# Patient Record
Sex: FEMALE | URBAN - METROPOLITAN AREA
[De-identification: names, ages, dates, MRNs, and addresses within clinical notes are randomized per-mention and may not be internally consistent; named-entity substitution may affect disease eponyms.]

---

## 2019-04-10 ENCOUNTER — APPOINTMENT (RX ONLY)
Dept: URBAN - METROPOLITAN AREA CLINIC 35 | Facility: CLINIC | Age: 84
Setting detail: DERMATOLOGY
End: 2019-04-10

## 2019-04-10 DIAGNOSIS — D18.0 HEMANGIOMA: ICD-10-CM

## 2019-04-10 DIAGNOSIS — L82.1 OTHER SEBORRHEIC KERATOSIS: ICD-10-CM

## 2019-04-10 DIAGNOSIS — L91.8 OTHER HYPERTROPHIC DISORDERS OF THE SKIN: ICD-10-CM

## 2019-04-10 DIAGNOSIS — L82.0 INFLAMED SEBORRHEIC KERATOSIS: ICD-10-CM

## 2019-04-10 PROBLEM — D18.01 HEMANGIOMA OF SKIN AND SUBCUTANEOUS TISSUE: Status: ACTIVE | Noted: 2019-04-10

## 2019-04-10 PROCEDURE — 17110 DESTRUCTION B9 LES UP TO 14: CPT

## 2019-04-10 PROCEDURE — 99202 OFFICE O/P NEW SF 15 MIN: CPT | Mod: 25

## 2019-04-10 PROCEDURE — ? COUNSELING

## 2019-04-10 PROCEDURE — ? OBSERVATION

## 2019-04-10 PROCEDURE — ? LIQUID NITROGEN

## 2019-04-10 ASSESSMENT — LOCATION DETAILED DESCRIPTION DERM
LOCATION DETAILED: LEFT RIB CAGE
LOCATION DETAILED: LEFT MEDIAL UPPER BACK
LOCATION DETAILED: RIGHT INFERIOR UPPER BACK
LOCATION DETAILED: LEFT CLAVICULAR NECK

## 2019-04-10 ASSESSMENT — LOCATION SIMPLE DESCRIPTION DERM
LOCATION SIMPLE: LEFT UPPER BACK
LOCATION SIMPLE: ABDOMEN
LOCATION SIMPLE: RIGHT UPPER BACK
LOCATION SIMPLE: LEFT ANTERIOR NECK

## 2019-04-10 ASSESSMENT — LOCATION ZONE DERM
LOCATION ZONE: TRUNK
LOCATION ZONE: NECK

## 2019-04-10 NOTE — PROCEDURE: OBSERVATION
X Size Of Lesion In Cm (Optional): 0
Detail Level: Simple
Body Location Override (Optional - Billing Will Still Be Based On Selected Body Map Location If Applicable): upper body

## 2019-04-10 NOTE — PROCEDURE: LIQUID NITROGEN
Post-Care Instructions: I reviewed with the patient in detail post-care instructions. Patient is to wear sunprotection, and avoid picking at any of the treated lesions. Pt may apply Vaseline to crusted or scabbing areas.
Render Post-Care Instructions In Note?: no
Consent: The patient's consent was obtained including but not limited to risks of crusting, scabbing, blistering, scarring, darker or lighter pigmentary change, recurrence, incomplete removal and infection.
Detail Level: Detailed
Medical Necessity Information: It is in your best interest to select a reason for this procedure from the list below. All of these items fulfill various CMS LCD requirements except the new and changing color options.
Number Of Freeze-Thaw Cycles: 1 freeze-thaw cycle
Medical Necessity Clause: This procedure was medically necessary because the lesions that were treated were:

## 2021-10-21 ENCOUNTER — APPOINTMENT (RX ONLY)
Dept: URBAN - METROPOLITAN AREA CLINIC 35 | Facility: CLINIC | Age: 86
Setting detail: DERMATOLOGY
End: 2021-10-21

## 2021-10-21 DIAGNOSIS — L57.0 ACTINIC KERATOSIS: ICD-10-CM | Status: INADEQUATELY CONTROLLED

## 2021-10-21 DIAGNOSIS — L82.0 INFLAMED SEBORRHEIC KERATOSIS: ICD-10-CM | Status: INADEQUATELY CONTROLLED

## 2021-10-21 PROCEDURE — 17000 DESTRUCT PREMALG LESION: CPT | Mod: 59

## 2021-10-21 PROCEDURE — ? LIQUID NITROGEN

## 2021-10-21 PROCEDURE — ? COUNSELING

## 2021-10-21 PROCEDURE — 17110 DESTRUCTION B9 LES UP TO 14: CPT

## 2021-10-21 ASSESSMENT — LOCATION ZONE DERM
LOCATION ZONE: FACE
LOCATION ZONE: TRUNK

## 2021-10-21 ASSESSMENT — LOCATION SIMPLE DESCRIPTION DERM
LOCATION SIMPLE: RIGHT CHEEK
LOCATION SIMPLE: RIGHT UPPER BACK

## 2021-10-21 ASSESSMENT — LOCATION DETAILED DESCRIPTION DERM
LOCATION DETAILED: RIGHT INFERIOR CENTRAL MALAR CHEEK
LOCATION DETAILED: RIGHT SUPERIOR UPPER BACK

## 2021-10-21 NOTE — PROCEDURE: LIQUID NITROGEN
Render Note In Bullet Format When Appropriate: No
Consent: The patient's consent was obtained including but not limited to risks of crusting, scabbing, blistering, scarring, darker or lighter pigmentary change, recurrence, incomplete removal and infection.
Detail Level: Detailed
Show Applicator Variable?: Yes
Post-Care Instructions: I reviewed with the patient in detail post-care instructions. Patient is to wear sunprotection, and avoid picking at any of the treated lesions. Pt may apply Vaseline to crusted or scabbing areas.
Medical Necessity Clause: This procedure was medically necessary because the lesions that were treated were:
Medical Necessity Information: It is in your best interest to select a reason for this procedure from the list below. All of these items fulfill various CMS LCD requirements except the new and changing color options.
Application Tool (Optional): Cry-AC
Duration Of Freeze Thaw-Cycle (Seconds): 5
Number Of Freeze-Thaw Cycles: 2 freeze-thaw cycles

## 2023-03-24 ENCOUNTER — APPOINTMENT (RX ONLY)
Dept: URBAN - METROPOLITAN AREA CLINIC 35 | Facility: CLINIC | Age: 88
Setting detail: DERMATOLOGY
End: 2023-03-24

## 2023-03-24 DIAGNOSIS — L82.0 INFLAMED SEBORRHEIC KERATOSIS: ICD-10-CM

## 2023-03-24 DIAGNOSIS — L30.0 NUMMULAR DERMATITIS: ICD-10-CM

## 2023-03-24 PROCEDURE — ? PHOTO-DOCUMENTATION

## 2023-03-24 PROCEDURE — ? LIQUID NITROGEN

## 2023-03-24 PROCEDURE — ? PRESCRIPTION

## 2023-03-24 PROCEDURE — ? COUNSELING

## 2023-03-24 PROCEDURE — 17110 DESTRUCTION B9 LES UP TO 14: CPT

## 2023-03-24 PROCEDURE — 99213 OFFICE O/P EST LOW 20 MIN: CPT | Mod: 25

## 2023-03-24 RX ORDER — TRIAMCINOLONE ACETONIDE 1 MG/G
CREAM TOPICAL
Qty: 45 | Refills: 4 | Status: ERX | COMMUNITY
Start: 2023-03-24

## 2023-03-24 RX ADMIN — TRIAMCINOLONE ACETONIDE: 1 CREAM TOPICAL at 00:00

## 2023-03-24 ASSESSMENT — LOCATION SIMPLE DESCRIPTION DERM
LOCATION SIMPLE: RIGHT UPPER BACK
LOCATION SIMPLE: RIGHT EAR

## 2023-03-24 ASSESSMENT — LOCATION ZONE DERM
LOCATION ZONE: EAR
LOCATION ZONE: TRUNK

## 2023-03-24 ASSESSMENT — LOCATION DETAILED DESCRIPTION DERM
LOCATION DETAILED: RIGHT SUPERIOR UPPER BACK
LOCATION DETAILED: RIGHT CRUS OF HELIX

## 2023-03-24 NOTE — PROCEDURE: LIQUID NITROGEN
Consent: The patient's consent was obtained including but not limited to risks of crusting, scabbing, blistering, scarring, darker or lighter pigmentary change, recurrence, incomplete removal and infection.
Post-Care Instructions: I reviewed with the patient in detail post-care instructions. Patient is to wear sunprotection, and avoid picking at any of the treated lesions. Pt may apply Vaseline to crusted or scabbing areas.
Render Note In Bullet Format When Appropriate: No
Medical Necessity Information: It is in your best interest to select a reason for this procedure from the list below. All of these items fulfill various CMS LCD requirements except the new and changing color options.
Spray Paint Text: The liquid nitrogen was applied to the skin utilizing a spray paint frosting technique.
Render Post-Care Instructions In Note?: yes
Detail Level: Detailed
Number Of Freeze-Thaw Cycles: 2 freeze-thaw cycles
Medical Necessity Clause: This procedure was medically necessary because the lesions that were treated were:
Application Tool (Optional): Forceps

## 2023-04-24 ENCOUNTER — APPOINTMENT (RX ONLY)
Dept: URBAN - METROPOLITAN AREA CLINIC 35 | Facility: CLINIC | Age: 88
Setting detail: DERMATOLOGY
End: 2023-04-24

## 2023-04-24 DIAGNOSIS — L82.0 INFLAMED SEBORRHEIC KERATOSIS: ICD-10-CM

## 2023-04-24 DIAGNOSIS — D18.0 HEMANGIOMA: ICD-10-CM

## 2023-04-24 PROBLEM — D18.01 HEMANGIOMA OF SKIN AND SUBCUTANEOUS TISSUE: Status: ACTIVE | Noted: 2023-04-24

## 2023-04-24 PROCEDURE — ? COUNSELING

## 2023-04-24 PROCEDURE — ? LIQUID NITROGEN

## 2023-04-24 PROCEDURE — 99212 OFFICE O/P EST SF 10 MIN: CPT | Mod: 25

## 2023-04-24 PROCEDURE — 17110 DESTRUCTION B9 LES UP TO 14: CPT

## 2023-04-24 ASSESSMENT — LOCATION SIMPLE DESCRIPTION DERM
LOCATION SIMPLE: LEFT BREAST
LOCATION SIMPLE: CHEST
LOCATION SIMPLE: RIGHT EAR

## 2023-04-24 ASSESSMENT — LOCATION DETAILED DESCRIPTION DERM
LOCATION DETAILED: RIGHT CRUS OF HELIX
LOCATION DETAILED: LEFT MEDIAL SUPERIOR CHEST
LOCATION DETAILED: LEFT MEDIAL BREAST 9-10:00 REGION

## 2023-04-24 ASSESSMENT — LOCATION ZONE DERM
LOCATION ZONE: TRUNK
LOCATION ZONE: EAR

## 2023-04-24 NOTE — PROCEDURE: LIQUID NITROGEN
Consent: The patient's consent was obtained including but not limited to risks of crusting, scabbing, blistering, scarring, darker or lighter pigmentary change, recurrence, incomplete removal and infection.
Post-Care Instructions: I reviewed with the patient in detail post-care instructions. Patient is to wear sunprotection, and avoid picking at any of the treated lesions. Pt may apply Vaseline to crusted or scabbing areas.
Render Note In Bullet Format When Appropriate: No
Medical Necessity Information: It is in your best interest to select a reason for this procedure from the list below. All of these items fulfill various CMS LCD requirements except the new and changing color options.
Spray Paint Text: The liquid nitrogen was applied to the skin utilizing a spray paint frosting technique.
Render Post-Care Instructions In Note?: yes
Detail Level: Detailed
Number Of Freeze-Thaw Cycles: 2 freeze-thaw cycles
Medical Necessity Clause: This procedure was medically necessary because the lesions that were treated were:

## 2023-09-07 ENCOUNTER — NEW PATIENT (OUTPATIENT)
Dept: URBAN - METROPOLITAN AREA CLINIC 91 | Facility: CLINIC | Age: 88
End: 2023-09-07

## 2023-09-07 DIAGNOSIS — H04.123: ICD-10-CM

## 2023-09-07 DIAGNOSIS — H34.11: ICD-10-CM

## 2023-09-07 PROCEDURE — 92235 FLUORESCEIN ANGRPH MLTIFRAME: CPT

## 2023-09-07 PROCEDURE — 92202 OPSCPY EXTND ON/MAC DRAW: CPT

## 2023-09-07 PROCEDURE — 92134 CPTRZ OPH DX IMG PST SGM RTA: CPT

## 2023-09-07 PROCEDURE — 99204 OFFICE O/P NEW MOD 45 MIN: CPT

## 2023-09-07 ASSESSMENT — VISUAL ACUITY
OS_SC: 20/80
OD_SC: CF 2FT
OS_PH: 20/40

## 2023-09-07 ASSESSMENT — TONOMETRY
OS_IOP_MMHG: 15
OD_IOP_MMHG: 11

## 2023-09-15 ENCOUNTER — PROBLEM (OUTPATIENT)
Dept: URBAN - METROPOLITAN AREA CLINIC 80 | Facility: CLINIC | Age: 88
End: 2023-09-15

## 2023-09-15 DIAGNOSIS — H34.11: ICD-10-CM

## 2023-09-15 PROCEDURE — 92014 COMPRE OPH EXAM EST PT 1/>: CPT

## 2023-09-15 PROCEDURE — 92134 CPTRZ OPH DX IMG PST SGM RTA: CPT

## 2023-09-15 PROCEDURE — 92202 OPSCPY EXTND ON/MAC DRAW: CPT

## 2023-09-15 ASSESSMENT — VISUAL ACUITY: OD_SC: CF 1FT

## 2023-09-15 ASSESSMENT — TONOMETRY: OD_IOP_MMHG: 16

## 2023-11-01 ENCOUNTER — FOLLOW UP (OUTPATIENT)
Dept: URBAN - METROPOLITAN AREA CLINIC 91 | Facility: CLINIC | Age: 88
End: 2023-11-01

## 2023-11-01 DIAGNOSIS — H34.11: ICD-10-CM

## 2023-11-01 PROCEDURE — 92202 OPSCPY EXTND ON/MAC DRAW: CPT

## 2023-11-01 PROCEDURE — 92134 CPTRZ OPH DX IMG PST SGM RTA: CPT

## 2023-11-01 PROCEDURE — 92014 COMPRE OPH EXAM EST PT 1/>: CPT

## 2023-11-01 ASSESSMENT — TONOMETRY
OS_IOP_MMHG: 20
OD_IOP_MMHG: 18

## 2023-11-01 ASSESSMENT — VISUAL ACUITY
OD_SC: CF 1FT
OS_SC: 20/80
OS_PH: 20/50

## 2023-12-06 ENCOUNTER — FOLLOW UP (OUTPATIENT)
Dept: URBAN - METROPOLITAN AREA CLINIC 91 | Facility: CLINIC | Age: 88
End: 2023-12-06

## 2023-12-06 DIAGNOSIS — H43.393: ICD-10-CM

## 2023-12-06 DIAGNOSIS — H34.11: ICD-10-CM

## 2023-12-06 DIAGNOSIS — H43.813: ICD-10-CM

## 2023-12-06 PROCEDURE — 92014 COMPRE OPH EXAM EST PT 1/>: CPT

## 2023-12-06 PROCEDURE — 92201 OPSCPY EXTND RTA DRAW UNI/BI: CPT

## 2023-12-06 PROCEDURE — 92134 CPTRZ OPH DX IMG PST SGM RTA: CPT

## 2023-12-06 ASSESSMENT — VISUAL ACUITY
OS_SC: 20/40-2
OD_SC: CF 2FT

## 2023-12-06 ASSESSMENT — TONOMETRY
OS_IOP_MMHG: 17
OD_IOP_MMHG: 16

## 2024-01-18 ENCOUNTER — FOLLOW UP (OUTPATIENT)
Dept: URBAN - METROPOLITAN AREA CLINIC 91 | Facility: CLINIC | Age: 89
End: 2024-01-18

## 2024-01-18 DIAGNOSIS — H34.11: ICD-10-CM

## 2024-01-18 DIAGNOSIS — H43.393: ICD-10-CM

## 2024-01-18 DIAGNOSIS — H43.813: ICD-10-CM

## 2024-01-18 PROCEDURE — 92202 OPSCPY EXTND ON/MAC DRAW: CPT

## 2024-01-18 PROCEDURE — 92134 CPTRZ OPH DX IMG PST SGM RTA: CPT

## 2024-01-18 PROCEDURE — 92014 COMPRE OPH EXAM EST PT 1/>: CPT

## 2024-01-18 ASSESSMENT — TONOMETRY
OD_IOP_MMHG: 16
OS_IOP_MMHG: 16

## 2024-01-18 ASSESSMENT — VISUAL ACUITY
OD_SC: CF 2FT
OS_PH: 20/40
OS_SC: 20/70

## 2024-03-19 ENCOUNTER — FOLLOW UP (OUTPATIENT)
Dept: URBAN - METROPOLITAN AREA CLINIC 91 | Facility: CLINIC | Age: 89
End: 2024-03-19

## 2024-03-19 DIAGNOSIS — H43.393: ICD-10-CM

## 2024-03-19 DIAGNOSIS — H34.11: ICD-10-CM

## 2024-03-19 DIAGNOSIS — H43.813: ICD-10-CM

## 2024-03-19 PROCEDURE — 92134 CPTRZ OPH DX IMG PST SGM RTA: CPT

## 2024-03-19 PROCEDURE — 92014 COMPRE OPH EXAM EST PT 1/>: CPT

## 2024-03-19 PROCEDURE — 92202 OPSCPY EXTND ON/MAC DRAW: CPT

## 2024-03-19 ASSESSMENT — TONOMETRY: OD_IOP_MMHG: 18

## 2024-03-19 ASSESSMENT — VISUAL ACUITY: OD_SC: CF 4FT

## 2024-06-04 ENCOUNTER — FOLLOW UP (OUTPATIENT)
Dept: URBAN - METROPOLITAN AREA CLINIC 91 | Facility: CLINIC | Age: 89
End: 2024-06-04

## 2024-06-04 DIAGNOSIS — H34.11: ICD-10-CM

## 2024-06-04 DIAGNOSIS — H43.393: ICD-10-CM

## 2024-06-04 DIAGNOSIS — H43.813: ICD-10-CM

## 2024-06-04 PROCEDURE — 92014 COMPRE OPH EXAM EST PT 1/>: CPT

## 2024-06-04 PROCEDURE — 92201 OPSCPY EXTND RTA DRAW UNI/BI: CPT

## 2024-06-04 PROCEDURE — 92134 CPTRZ OPH DX IMG PST SGM RTA: CPT

## 2024-06-04 ASSESSMENT — VISUAL ACUITY
OS_PH: 20/40-1
OS_SC: 20/50-1
OD_SC: CF 1FT

## 2024-06-04 ASSESSMENT — TONOMETRY
OD_IOP_MMHG: 11
OS_IOP_MMHG: 13

## 2024-09-09 ENCOUNTER — APPOINTMENT (RX ONLY)
Dept: URBAN - METROPOLITAN AREA CLINIC 35 | Facility: CLINIC | Age: 89
Setting detail: DERMATOLOGY
End: 2024-09-09

## 2024-09-09 DIAGNOSIS — L30.8 OTHER SPECIFIED DERMATITIS: ICD-10-CM

## 2024-09-09 DIAGNOSIS — L72.0 EPIDERMAL CYST: ICD-10-CM

## 2024-09-09 DIAGNOSIS — L82.0 INFLAMED SEBORRHEIC KERATOSIS: ICD-10-CM

## 2024-09-09 DIAGNOSIS — D18.0 HEMANGIOMA: ICD-10-CM

## 2024-09-09 DIAGNOSIS — L82.1 OTHER SEBORRHEIC KERATOSIS: ICD-10-CM

## 2024-09-09 PROBLEM — D18.01 HEMANGIOMA OF SKIN AND SUBCUTANEOUS TISSUE: Status: ACTIVE | Noted: 2024-09-09

## 2024-09-09 PROCEDURE — ? LIQUID NITROGEN

## 2024-09-09 PROCEDURE — 99213 OFFICE O/P EST LOW 20 MIN: CPT | Mod: 25

## 2024-09-09 PROCEDURE — 17110 DESTRUCTION B9 LES UP TO 14: CPT

## 2024-09-09 PROCEDURE — ? DIAGNOSIS COMMENT

## 2024-09-09 PROCEDURE — ? PRESCRIPTION MEDICATION MANAGEMENT

## 2024-09-09 PROCEDURE — ? COUNSELING

## 2024-09-09 ASSESSMENT — LOCATION SIMPLE DESCRIPTION DERM
LOCATION SIMPLE: LEFT UPPER BACK
LOCATION SIMPLE: RIGHT EAR
LOCATION SIMPLE: RIGHT UPPER BACK
LOCATION SIMPLE: RIGHT FOREARM
LOCATION SIMPLE: LEFT FOREARM

## 2024-09-09 ASSESSMENT — LOCATION ZONE DERM
LOCATION ZONE: TRUNK
LOCATION ZONE: EAR
LOCATION ZONE: ARM

## 2024-09-09 ASSESSMENT — LOCATION DETAILED DESCRIPTION DERM
LOCATION DETAILED: RIGHT PROXIMAL DORSAL FOREARM
LOCATION DETAILED: LEFT PROXIMAL DORSAL FOREARM
LOCATION DETAILED: LEFT DISTAL DORSAL FOREARM
LOCATION DETAILED: RIGHT DISTAL DORSAL FOREARM
LOCATION DETAILED: LEFT MID-UPPER BACK
LOCATION DETAILED: RIGHT INFERIOR UPPER BACK
LOCATION DETAILED: RIGHT CAVUM CONCHA

## 2024-09-09 NOTE — PROCEDURE: LIQUID NITROGEN
Consent: The patient's consent was obtained including but not limited to risks of crusting, scabbing, blistering, scarring, darker or lighter pigmentary change, recurrence, incomplete removal and infection.
Detail Level: Detailed
Show Spray Paint Technique Variable?: Yes
Post-Care Instructions: I reviewed with the patient in detail post-care instructions. Patient is to wear sunprotection, and avoid picking at any of the treated lesions. Pt may apply Vaseline to crusted or scabbing areas.
Medical Necessity Information: It is in your best interest to select a reason for this procedure from the list below. All of these items fulfill various CMS LCD requirements except the new and changing color options.
Render Post-Care Instructions In Note?: no
Spray Paint Text: The liquid nitrogen was applied to the skin utilizing a spray paint frosting technique.
Medical Necessity Clause: This procedure was medically necessary because the lesions that were treated were:

## 2024-09-09 NOTE — PROCEDURE: PRESCRIPTION MEDICATION MANAGEMENT
Render In Strict Bullet Format?: No
Plan: Recommended OTC CeraVe Anti-Itch cream or Dermaleve for itching
Samples Given: Dermaleve anti-itch lotion
Detail Level: Zone

## 2024-09-09 NOTE — PROCEDURE: DIAGNOSIS COMMENT
Detail Level: Simple
Comment: Developed after patient was wearing new hearing aid. Discomfort improved after switching hearing aid again.
Render Risk Assessment In Note?: no